# Patient Record
Sex: MALE | Race: ASIAN | NOT HISPANIC OR LATINO | URBAN - METROPOLITAN AREA
[De-identification: names, ages, dates, MRNs, and addresses within clinical notes are randomized per-mention and may not be internally consistent; named-entity substitution may affect disease eponyms.]

---

## 2017-08-29 ENCOUNTER — INPATIENT (INPATIENT)
Facility: HOSPITAL | Age: 22
LOS: 14 days | Discharge: HOME | End: 2017-09-13
Attending: PSYCHIATRY & NEUROLOGY | Admitting: STUDENT IN AN ORGANIZED HEALTH CARE EDUCATION/TRAINING PROGRAM
Payer: COMMERCIAL

## 2017-08-29 VITALS
DIASTOLIC BLOOD PRESSURE: 66 MMHG | RESPIRATION RATE: 18 BRPM | HEART RATE: 87 BPM | SYSTOLIC BLOOD PRESSURE: 120 MMHG | TEMPERATURE: 98 F | OXYGEN SATURATION: 100 %

## 2017-08-29 NOTE — ED ADULT NURSE NOTE - OBJECTIVE STATEMENT
pt received in  c/o noncompliance to meds, pt denies SI,HI&AH, denies ETOH and substance use. awaiting psych eval

## 2017-08-29 NOTE — ED ADULT TRIAGE NOTE - CHIEF COMPLAINT QUOTE
pt arrives bib ems, per ems patients family called 911 because he has been staying in his office for 5 days, not sleeping and not taking any meds. denies any si/hi/ah/vh, no medical complaints. calm and cooperative. thoughts disorganized in triage. denies etoh/ drug use.  called, pt escorted to .

## 2017-08-30 DIAGNOSIS — R46.89 OTHER SYMPTOMS AND SIGNS INVOLVING APPEARANCE AND BEHAVIOR: ICD-10-CM

## 2017-08-30 DIAGNOSIS — F29 UNSPECIFIED PSYCHOSIS NOT DUE TO A SUBSTANCE OR KNOWN PHYSIOLOGICAL CONDITION: ICD-10-CM

## 2017-08-30 LAB
ALBUMIN SERPL ELPH-MCNC: 4.7 G/DL — SIGNIFICANT CHANGE UP (ref 3.3–5)
ALP SERPL-CCNC: 90 U/L — SIGNIFICANT CHANGE UP (ref 40–120)
ALT FLD-CCNC: 11 U/L — SIGNIFICANT CHANGE UP (ref 4–41)
AMORPH CRY # UR COMP ASSIST: SIGNIFICANT CHANGE UP (ref 0–0)
AMPHET UR-MCNC: NEGATIVE — SIGNIFICANT CHANGE UP
APAP SERPL-MCNC: < 15 UG/ML — LOW (ref 15–25)
APPEARANCE UR: SIGNIFICANT CHANGE UP
AST SERPL-CCNC: 17 U/L — SIGNIFICANT CHANGE UP (ref 4–40)
BACTERIA # UR AUTO: SIGNIFICANT CHANGE UP
BARBITURATES MEASUREMENT: NEGATIVE — SIGNIFICANT CHANGE UP
BARBITURATES UR SCN-MCNC: NEGATIVE — SIGNIFICANT CHANGE UP
BASOPHILS # BLD AUTO: 0.05 K/UL — SIGNIFICANT CHANGE UP (ref 0–0.2)
BASOPHILS NFR BLD AUTO: 0.5 % — SIGNIFICANT CHANGE UP (ref 0–2)
BENZODIAZ SERPL-MCNC: NEGATIVE — SIGNIFICANT CHANGE UP
BENZODIAZ UR-MCNC: NEGATIVE — SIGNIFICANT CHANGE UP
BILIRUB SERPL-MCNC: 0.3 MG/DL — SIGNIFICANT CHANGE UP (ref 0.2–1.2)
BILIRUB UR-MCNC: NEGATIVE — SIGNIFICANT CHANGE UP
BLOOD UR QL VISUAL: NEGATIVE — SIGNIFICANT CHANGE UP
BUN SERPL-MCNC: 13 MG/DL — SIGNIFICANT CHANGE UP (ref 7–23)
CALCIUM SERPL-MCNC: 9.6 MG/DL — SIGNIFICANT CHANGE UP (ref 8.4–10.5)
CANNABINOIDS UR-MCNC: NEGATIVE — SIGNIFICANT CHANGE UP
CHLORIDE SERPL-SCNC: 100 MMOL/L — SIGNIFICANT CHANGE UP (ref 98–107)
CO2 SERPL-SCNC: 25 MMOL/L — SIGNIFICANT CHANGE UP (ref 22–31)
COCAINE METAB.OTHER UR-MCNC: NEGATIVE — SIGNIFICANT CHANGE UP
COLOR SPEC: YELLOW — SIGNIFICANT CHANGE UP
CREAT SERPL-MCNC: 0.84 MG/DL — SIGNIFICANT CHANGE UP (ref 0.5–1.3)
EOSINOPHIL # BLD AUTO: 0.16 K/UL — SIGNIFICANT CHANGE UP (ref 0–0.5)
EOSINOPHIL NFR BLD AUTO: 1.7 % — SIGNIFICANT CHANGE UP (ref 0–6)
ETHANOL BLD-MCNC: < 10 MG/DL — SIGNIFICANT CHANGE UP
GLUCOSE SERPL-MCNC: 83 MG/DL — SIGNIFICANT CHANGE UP (ref 70–99)
GLUCOSE UR-MCNC: NEGATIVE — SIGNIFICANT CHANGE UP
HCT VFR BLD CALC: 48.6 % — SIGNIFICANT CHANGE UP (ref 39–50)
HGB BLD-MCNC: 16.1 G/DL — SIGNIFICANT CHANGE UP (ref 13–17)
IMM GRANULOCYTES # BLD AUTO: 0.01 # — SIGNIFICANT CHANGE UP
IMM GRANULOCYTES NFR BLD AUTO: 0.1 % — SIGNIFICANT CHANGE UP (ref 0–1.5)
KETONES UR-MCNC: NEGATIVE — SIGNIFICANT CHANGE UP
LEUKOCYTE ESTERASE UR-ACNC: NEGATIVE — SIGNIFICANT CHANGE UP
LYMPHOCYTES # BLD AUTO: 3.26 K/UL — SIGNIFICANT CHANGE UP (ref 1–3.3)
LYMPHOCYTES # BLD AUTO: 35 % — SIGNIFICANT CHANGE UP (ref 13–44)
MCHC RBC-ENTMCNC: 29 PG — SIGNIFICANT CHANGE UP (ref 27–34)
MCHC RBC-ENTMCNC: 33.1 % — SIGNIFICANT CHANGE UP (ref 32–36)
MCV RBC AUTO: 87.4 FL — SIGNIFICANT CHANGE UP (ref 80–100)
METHADONE UR-MCNC: NEGATIVE — SIGNIFICANT CHANGE UP
MONOCYTES # BLD AUTO: 0.69 K/UL — SIGNIFICANT CHANGE UP (ref 0–0.9)
MONOCYTES NFR BLD AUTO: 7.4 % — SIGNIFICANT CHANGE UP (ref 2–14)
MUCOUS THREADS # UR AUTO: SIGNIFICANT CHANGE UP
NEUTROPHILS # BLD AUTO: 5.14 K/UL — SIGNIFICANT CHANGE UP (ref 1.8–7.4)
NEUTROPHILS NFR BLD AUTO: 55.3 % — SIGNIFICANT CHANGE UP (ref 43–77)
NITRITE UR-MCNC: NEGATIVE — SIGNIFICANT CHANGE UP
NRBC # FLD: 0 — SIGNIFICANT CHANGE UP
OPIATES UR-MCNC: NEGATIVE — SIGNIFICANT CHANGE UP
OXYCODONE UR-MCNC: NEGATIVE — SIGNIFICANT CHANGE UP
PCP UR-MCNC: NEGATIVE — SIGNIFICANT CHANGE UP
PH UR: 6.5 — SIGNIFICANT CHANGE UP (ref 4.6–8)
PLATELET # BLD AUTO: 248 K/UL — SIGNIFICANT CHANGE UP (ref 150–400)
PMV BLD: 10.3 FL — SIGNIFICANT CHANGE UP (ref 7–13)
POTASSIUM SERPL-MCNC: 3.8 MMOL/L — SIGNIFICANT CHANGE UP (ref 3.5–5.3)
POTASSIUM SERPL-SCNC: 3.8 MMOL/L — SIGNIFICANT CHANGE UP (ref 3.5–5.3)
PROT SERPL-MCNC: 7.8 G/DL — SIGNIFICANT CHANGE UP (ref 6–8.3)
PROT UR-MCNC: 10 — SIGNIFICANT CHANGE UP
RBC # BLD: 5.56 M/UL — SIGNIFICANT CHANGE UP (ref 4.2–5.8)
RBC # FLD: 13.1 % — SIGNIFICANT CHANGE UP (ref 10.3–14.5)
RBC CASTS # UR COMP ASSIST: SIGNIFICANT CHANGE UP (ref 0–?)
SALICYLATES SERPL-MCNC: < 5 MG/DL — LOW (ref 15–30)
SODIUM SERPL-SCNC: 140 MMOL/L — SIGNIFICANT CHANGE UP (ref 135–145)
SP GR SPEC: 1.02 — SIGNIFICANT CHANGE UP (ref 1–1.03)
TSH SERPL-MCNC: 1.48 UIU/ML — SIGNIFICANT CHANGE UP (ref 0.27–4.2)
UROBILINOGEN FLD QL: NORMAL E.U. — SIGNIFICANT CHANGE UP (ref 0.1–0.2)
WBC # BLD: 9.31 K/UL — SIGNIFICANT CHANGE UP (ref 3.8–10.5)
WBC # FLD AUTO: 9.31 K/UL — SIGNIFICANT CHANGE UP (ref 3.8–10.5)
WBC UR QL: SIGNIFICANT CHANGE UP (ref 0–?)

## 2017-08-30 PROCEDURE — 99222 1ST HOSP IP/OBS MODERATE 55: CPT

## 2017-08-30 PROCEDURE — 99285 EMERGENCY DEPT VISIT HI MDM: CPT

## 2017-08-30 RX ORDER — RISPERIDONE 4 MG/1
1 TABLET ORAL AT BEDTIME
Qty: 0 | Refills: 0 | Status: DISCONTINUED | OUTPATIENT
Start: 2017-08-30 | End: 2017-09-02

## 2017-08-30 RX ADMIN — Medication 2 MILLIGRAM(S): at 16:01

## 2017-08-30 RX ADMIN — RISPERIDONE 1 MILLIGRAM(S): 4 TABLET ORAL at 22:15

## 2017-08-30 NOTE — ED BEHAVIORAL HEALTH ASSESSMENT NOTE - HPI (INCLUDE ILLNESS QUALITY, SEVERITY, DURATION, TIMING, CONTEXT, MODIFYING FACTORS, ASSOCIATED SIGNS AND SYMPTOMS)
Patient is a 22 year old male, domiciled, unemployed but previously student at [employer] last attended Spring 2017, non-caregiver, with family reported PPH of Anxiety, and OCD, no prior hospitalizations, no current outpatient treatment, denies hx of self harm behaviors, denies prior suicide attempts, denies hx of violence or arrests, denies trauma, denies substance use/abuse, with no relevant past medical history, brought in by EMS/Family, presenting with psychosis.    Spoke with pt's parents and brother - Patient has a PPH of Anxiety and OCD (as per family, previously diagnosed by psychiatrist). He was previously in treatment with a psychiatrist Dr. Altaf Georges and prescribed Lexapro. In addition, he was followed by school psychologist Dr. Sterling Frazier. Patient was previously in [employer] and doing well. However, about one year ago, he was not doing well so transferred to [employer]. Patient has been attempting to take excessive number of classes despite transferring to lower his work load. Patient will then drop the classes after a few weeks, causing his family to lose tuition money. Patient has stopped taking his Lexapro, although unclear how long this has been going on. Patient has been taking Piracetam and Ashwagandha, supplements from the internet instead because he wants to improve his brain function. Since the end of last semester, pt's family has started to notice an acute change in behavior, although they state it may have been going on for longer without them realizing. For the past 2-3 months, Patient is a 22 year old male, domiciled, unemployed but previously student at [employer] last attended Spring 2017, non-caregiver, with family reported PPH of Anxiety, and OCD, no prior hospitalizations, no current outpatient treatment, denies hx of self harm behaviors, denies prior suicide attempts, denies hx of violence or arrests, denies trauma, denies substance use/abuse, with no relevant past medical history, brought in by EMS/Family, presenting with psychosis. Patient is a 22 year old male, domiciled, unemployed but previously student at [employer] last attended Spring 2017, non-caregiver, with family reported PPH of Anxiety, and OCD, no prior hospitalizations, no current outpatient treatment, denies hx of self harm behaviors, denies prior suicide attempts, denies hx of violence or arrests, denies trauma, denies substance use/abuse, with no relevant past medical history, brought in by EMS/Family, presenting with psychosis.    See  note for extensive collateral from parents, brother, and Dr. Cervantes (Ophthalmologist who called 911 tonight). In summary - for the past 2-3 months pt has been decompensating, isolative, responding to internal stimuli, listening to white noise constantly, delusional, paranoid, not sleeping, not eating, not showering. Family advocates for admission. Patient is a 22 year old male, domiciled, unemployed but previously student at [employer] last attended Spring 2017, non-caregiver, with family reported PPH of Anxiety, and OCD, no prior hospitalizations, no current outpatient treatment, denies hx of self harm behaviors, denies prior suicide attempts, denies hx of violence or arrests, denies trauma, denies substance use/abuse, with no relevant past medical history, brought in by EMS/Family, presenting with psychosis.    See  note for extensive collateral from parents, brother, and Dr. Cervantes (Ophthalmologist who called 911 tonight). In summary - for the past 2-3 months pt has been decompensating, isolative, responding to internal stimuli, listening to white noise constantly, delusional, paranoid, not sleeping, not eating, not showering. Family advocates for admission.    Pt noted to be disorganized on exam, having trouble answering questions, notably internally preoccupied and mumbling to himself. Patient reports he is only here because his parents are concerned. He states he has been staying in his place of work and working on a project (as per dad pt has been going to dad's office). Pt refusing to state what this project is, but states it has to do with pharmaceuticals and animals. Pt also states he is working on a gun control assignment for college even though he is not registered for classes. Pt states he has been "napping" for 2-4 hours at the office in between working but has not slept a full night's sleep in a while (unable to state). Pt states he feels "very good" and has "a lot of good and positive energy". Pt admits to not showering for about 2 weeks, states "that's normal though. I'm sure other people don't need to shower. Besides I have naturally good odor". He states his mind is full of "numbers, associates and files" which is why he is struggling to answer questions and noted to be mumbling to himself at times. Pt denies any s/s of depression, denies suicidal ideations, intent or plans. Denies aggressive or homicidal ideations, intent or plans. Denies alcohol or drug use/abuse but later told other staff "I partied a lot, maybe I partied too much".

## 2017-08-30 NOTE — ED ADULT NURSE REASSESSMENT NOTE - GENERAL PATIENT STATE
pt remains awake, restless, laughing to self with disorganized thought process. Pt minimizes psychiatric symptoms, denies s/i h/i or a/v/h, states 'I was in my office, I am 22 years of age and my family is concerned"./no change observed

## 2017-08-30 NOTE — ED PROVIDER NOTE - OBJECTIVE STATEMENT
22M denying any PMH, per triage note "pt arrives bib ems, per ems patients family called 911 because he has been staying in his office for 5 days, not sleeping and not taking any meds. denies any si/hi/ah/vh, no medical complaints. calm and cooperative. thoughts disorganized in triage. denies etoh/ drug use.  called, pt escorted to "  PEr pt, he was just working in his office 22M denying any PMH, per triage note "pt arrives bib ems, per ems patients family called 911 because he has been staying in his office for 5 days, not sleeping and not taking any meds. denies any si/hi/ah/vh, no medical complaints. calm and cooperative. thoughts disorganized in triage. denies etoh/ drug use.  called, pt escorted to "  Per pt, he was just working in his office. Denies HA, vision changes, weakness/numbness, f/c, SOB/CP, abd pain, urinary complaints, SI/HI. Denies psychiatric hx.

## 2017-08-30 NOTE — ED BEHAVIORAL HEALTH ASSESSMENT NOTE - RISK ASSESSMENT
Risk factors: Single, male, hx of depression/anxiety, currently psychotic and disorganized, academic decline, non-compliant with outpatient follow-up, poor insight into symptoms, difficulty expressing thoughts, not sleeping, not eating properly, not caring for ADLs.

## 2017-08-30 NOTE — ED BEHAVIORAL HEALTH ASSESSMENT NOTE - CASE SUMMARY
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 Pt has no insight and unwilling to engage/re-enter outpt care, requires emergency admission for safety and stabilization.

## 2017-08-30 NOTE — ED BEHAVIORAL HEALTH ASSESSMENT NOTE - OTHER
Brother Family/Dr. Cervantes Ophthalmologist Previously enrolled at [employer] with last attending Spring 2017 Psychosis Lower end of average build Denies auditory/visual hallucinations however responding to internal stimuli in ER Psychosis, disorganized [id number]

## 2017-08-30 NOTE — ED BEHAVIORAL HEALTH ASSESSMENT NOTE - SUMMARY
Patient is a 22 year old male, domiciled, unemployed but previously student at [employer] last attended Spring 2017, non-caregiver, with family reported PPH of Anxiety, and OCD, no prior hospitalizations, no current outpatient treatment, denies hx of self harm behaviors, denies prior suicide attempts, denies hx of violence or arrests, denies trauma, denies substance use/abuse, with no relevant past medical history, brought in by EMS/Family, presenting with psychosis.    Pt disorganized, internally preoccupied, disheveled and as per parents has been paranoid, delusional, not showering, not sleeping, and only eating select foods at home. Pt is no longer able to care for himself and requires hospitalization for safety and stabilization.

## 2017-08-30 NOTE — ED PROVIDER NOTE - MEDICAL DECISION MAKING DETAILS
22M denies any PMH p/w concern for not sleeping, acting strange. Pt denying any complaints. Vitals wnl, exam as above.  ddx: Likely new psych. Will eval for underlying metabolic component.  CBC, cmp, TSH, EKG. BH eval reassess.

## 2017-08-30 NOTE — ED PROVIDER NOTE - PHYSICAL EXAMINATION
No clonus, rigidity, tremors, fasciculations. PERRL, EOMI, no nystagmus. Strength 5/5.  steady unassisted gait.  disorganized, occasionally stuttering/mumbling/rambling

## 2017-08-30 NOTE — ED BEHAVIORAL HEALTH ASSESSMENT NOTE - SUICIDE PROTECTIVE FACTORS
Positive therapeutic relationships/Supportive social network or family/Identifies reasons for living/Future oriented
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
## 2017-08-30 NOTE — ED PROVIDER NOTE - PROGRESS NOTE DETAILS
[facility]: labs, ua/tox wnl. EKG sinus medardo. Medically cleared for further BH eval. [place]: Medically cleared for [facility] admission.

## 2017-08-31 PROCEDURE — 99232 SBSQ HOSP IP/OBS MODERATE 35: CPT

## 2017-08-31 RX ADMIN — RISPERIDONE 1 MILLIGRAM(S): 4 TABLET ORAL at 21:32

## 2017-09-01 PROCEDURE — 99232 SBSQ HOSP IP/OBS MODERATE 35: CPT

## 2017-09-01 RX ADMIN — RISPERIDONE 1 MILLIGRAM(S): 4 TABLET ORAL at 22:04

## 2017-09-02 PROCEDURE — 99232 SBSQ HOSP IP/OBS MODERATE 35: CPT

## 2017-09-02 RX ORDER — RISPERIDONE 4 MG/1
2 TABLET ORAL AT BEDTIME
Qty: 0 | Refills: 0 | Status: DISCONTINUED | OUTPATIENT
Start: 2017-09-02 | End: 2017-09-13

## 2017-09-02 RX ADMIN — RISPERIDONE 2 MILLIGRAM(S): 4 TABLET ORAL at 21:26

## 2017-09-03 PROCEDURE — 99232 SBSQ HOSP IP/OBS MODERATE 35: CPT

## 2017-09-03 RX ADMIN — RISPERIDONE 2 MILLIGRAM(S): 4 TABLET ORAL at 21:04

## 2017-09-04 PROCEDURE — 99232 SBSQ HOSP IP/OBS MODERATE 35: CPT

## 2017-09-04 RX ADMIN — RISPERIDONE 2 MILLIGRAM(S): 4 TABLET ORAL at 21:29

## 2017-09-05 PROCEDURE — 99232 SBSQ HOSP IP/OBS MODERATE 35: CPT | Mod: GC

## 2017-09-05 RX ORDER — SENNA PLUS 8.6 MG/1
2 TABLET ORAL AT BEDTIME
Qty: 0 | Refills: 0 | Status: DISCONTINUED | OUTPATIENT
Start: 2017-09-05 | End: 2017-09-11

## 2017-09-05 RX ADMIN — SENNA PLUS 2 TABLET(S): 8.6 TABLET ORAL at 23:16

## 2017-09-05 RX ADMIN — RISPERIDONE 2 MILLIGRAM(S): 4 TABLET ORAL at 21:16

## 2017-09-06 PROCEDURE — 99232 SBSQ HOSP IP/OBS MODERATE 35: CPT | Mod: GC

## 2017-09-06 RX ADMIN — RISPERIDONE 2 MILLIGRAM(S): 4 TABLET ORAL at 21:25

## 2017-09-07 PROCEDURE — 99232 SBSQ HOSP IP/OBS MODERATE 35: CPT | Mod: GC

## 2017-09-07 RX ADMIN — RISPERIDONE 2 MILLIGRAM(S): 4 TABLET ORAL at 21:03

## 2017-09-08 PROCEDURE — 99232 SBSQ HOSP IP/OBS MODERATE 35: CPT | Mod: GC

## 2017-09-08 RX ADMIN — RISPERIDONE 2 MILLIGRAM(S): 4 TABLET ORAL at 21:45

## 2017-09-09 RX ADMIN — RISPERIDONE 2 MILLIGRAM(S): 4 TABLET ORAL at 21:16

## 2017-09-10 RX ORDER — LANOLIN ALCOHOL/MO/W.PET/CERES
3 CREAM (GRAM) TOPICAL AT BEDTIME
Qty: 0 | Refills: 0 | Status: DISCONTINUED | OUTPATIENT
Start: 2017-09-10 | End: 2017-09-13

## 2017-09-10 RX ADMIN — Medication 3 MILLIGRAM(S): at 21:00

## 2017-09-10 RX ADMIN — RISPERIDONE 2 MILLIGRAM(S): 4 TABLET ORAL at 21:03

## 2017-09-11 PROCEDURE — 99232 SBSQ HOSP IP/OBS MODERATE 35: CPT

## 2017-09-11 RX ORDER — SENNA PLUS 8.6 MG/1
2 TABLET ORAL AT BEDTIME
Qty: 0 | Refills: 0 | Status: DISCONTINUED | OUTPATIENT
Start: 2017-09-11 | End: 2017-09-13

## 2017-09-11 RX ORDER — RISPERIDONE 4 MG/1
1 TABLET ORAL
Qty: 15 | Refills: 0 | OUTPATIENT
Start: 2017-09-11 | End: 2017-09-26

## 2017-09-11 RX ADMIN — Medication 3 MILLIGRAM(S): at 20:44

## 2017-09-11 RX ADMIN — RISPERIDONE 2 MILLIGRAM(S): 4 TABLET ORAL at 20:44

## 2017-09-12 PROCEDURE — 90853 GROUP PSYCHOTHERAPY: CPT

## 2017-09-12 PROCEDURE — 99232 SBSQ HOSP IP/OBS MODERATE 35: CPT | Mod: GC

## 2017-09-12 RX ADMIN — RISPERIDONE 2 MILLIGRAM(S): 4 TABLET ORAL at 21:45

## 2017-09-13 VITALS — HEART RATE: 89 BPM | TEMPERATURE: 97 F | DIASTOLIC BLOOD PRESSURE: 69 MMHG | SYSTOLIC BLOOD PRESSURE: 96 MMHG

## 2017-09-13 PROCEDURE — 99238 HOSP IP/OBS DSCHRG MGMT 30/<: CPT | Mod: GC

## 2017-09-19 ENCOUNTER — OUTPATIENT (OUTPATIENT)
Dept: OUTPATIENT SERVICES | Facility: HOSPITAL | Age: 22
LOS: 1 days | Discharge: HOME | End: 2017-09-19

## 2017-09-26 DIAGNOSIS — F20.81 SCHIZOPHRENIFORM DISORDER: ICD-10-CM

## 2017-10-24 PROBLEM — Z00.00 ENCOUNTER FOR PREVENTIVE HEALTH EXAMINATION: Status: ACTIVE | Noted: 2017-10-24

## 2017-10-25 ENCOUNTER — APPOINTMENT (OUTPATIENT)
Dept: MRI IMAGING | Facility: HOSPITAL | Age: 22
End: 2017-10-25

## 2017-10-30 ENCOUNTER — APPOINTMENT (OUTPATIENT)
Dept: MRI IMAGING | Facility: CLINIC | Age: 22
End: 2017-10-30

## 2017-11-13 NOTE — ED PROVIDER NOTE - CADM POA PRESS ULCER
[facility] Labor and Delivery History and Physical    Paulo Lema MRN# [id number]   Age: 27 year old YOB: 1990     Date of Admission:  2017    Primary care provider: No Ref-Primary, Physician           Chief Complaint:   Paulo Lema is a 27 year old female who is 39w1d pregnant and recommended for .          Pregnancy history:     OBSTETRIC HISTORY:    Obstetric History       T0      L0     SAB0   TAB0   Ectopic0   Multiple0   Live Births0       # Outcome Date GA Lbr González/2nd Weight Sex Delivery Anes PTL Lv   1 Current                   EDC: Estimated Date of Delivery: Data Unavailable    Prenatal Labs: Lab Results   Component Value Date    ABO A 2017    RH Pos 2017    AS Neg 2017    HEPBANG Nonreactive 2017    CHPCRT  2017     Negative   Negative for C. trachomatis rRNA by transcription mediated amplification.   A negative result by transcription mediated amplification does not preclude the   presence of C. trachomatis infection because results are dependent on proper   and adequate collection, absence of inhibitors, and sufficient rRNA to be   detected.      GCPCRT  2017     Negative   Negative for N. gonorrhoeae rRNA by transcription mediated amplification.   A negative result by transcription mediated amplification does not preclude the   presence of N. gonorrhoeae infection because results are dependent on proper   and adequate collection, absence of inhibitors, and sufficient rRNA to be   detected.      TREPAB Negative 2017    HGB 10.0 (L) 08/10/2017       GBS Status:   Lab Results   Component Value Date    GBS Negative 10/24/2017       Active Problem List  Patient Active Problem List   Diagnosis     High risk pregnancy in young primigravida     Indication for care in labor or delivery       Medication Prior to Admission  Prescriptions Prior to Admission   Medication Sig Dispense Refill Last Dose      Prenatal Vit-Fe Fumarate-FA (PRENATAL MULTIVITAMIN  PLUS IRON) 27-0.8 MG TABS per tablet Take 1 tablet by mouth daily   11/10/2017 at Unknown time     Misc. Devices (BREAST PUMP) MISC 1 each daily 1 each 0      insulin isophane human (HUMULIN N PEN) 100 UNIT/ML injection Inject up to 12 units before bed every day. 30 mL 1 Taking     doxylamine (UNISOM) 25 MG TABS tablet Take 25 mg by mouth At Bedtime   Taking     insulin pen needle (INSUPEN PEN NEEDLES) 32G X 4 MM Use 1 pen needle daily or as directed. 100 each 1 Taking     blood glucose monitoring (DACIA MICROLET) lancets Use to test blood sugar 4 times daily or as directed. 100 each 1 Taking     acetone, Urine, test STRP Test once daily x1 week, then reduce to once weekly if consistently negative 100 each 3 Taking     DACIA CONTOUR NEXT test strip USE TO TEST BLOOD SUGAR FOUR TIMES DAILY OR AS DIRECTED 300 strip 3 Taking   .        Maternal Past Medical History:     Past Medical History:   Diagnosis Date     Diabetes (H)     w/ first pregnancy-on insulin                       Family History:     Family History   Problem Relation Age of Onset     Family History Negative Mother                Social History:     Social History   Substance Use Topics     Smoking status: Never Smoker     Smokeless tobacco: Never Used     Alcohol use No            Review of Systems:   The Review of Systems is negative other than noted in the HPI          Physical Exam:   Patient Vitals for the past 8 hrs:   BP Temp Temp src Resp SpO2   11/12/17 1853 - 99.1  F (37.3  C) Axillary - -   11/12/17 1745 119/65 - - - -   11/12/17 1715 120/70 - - - -   11/12/17 1710 - 98.5  F (36.9  C) Axillary - -   11/12/17 1606 - - - - 96 %   11/12/17 1551 - - - - 97 %   11/12/17 1536 - - - - 94 %   11/12/17 1521 - - - - 95 %   11/12/17 1510 119/68 98.2  F (36.8  C) Axillary 18 -   11/12/17 1500 117/61 98.8  F (37.1  C) Oral 16 -   11/12/17 1421 - - - - 99 %   11/12/17 1417 122/67 - - - -   11/12/17 1415  122/64 - - 16 -   17 1413 128/76 - - 16 -   17 1411 133/79 - - 16 -   17 1405 134/72 - - - 99 %   17 1335 - - - - 98 %   17 1305 128/75 - - - -   17 1251 - 98.6  F (37  C) Oral - 99 %   17 1206 - - - - 97 %   17 1204 116/63 - - 16 -   17 1133 114/63 - - 16 -     Constitutional:   awake, alert, cooperative, no apparent distress, and appears stated age     Lungs:   No increased work of breathing, good air exchange, clear to auscultation bilaterally, no crackles or wheezing     Cardiovascular:   Normal apical impulse, regular rate and rhythm, normal S1 and S2, no S3 or S4, and no murmur noted     Abdomen:   No scars, normal bowel sounds, soft, gravid, non-tender     Neuropsychiatric:   General: normal, calm and normal eye contact     Extremities: no edema bilateral      Cervix:5.6/90/-1  Presentation:Cephalic  Fetal Heart Rate Tracing: reactive and reassuring at 160 bpm  Tocometer: IUPC and frequency q 2-4 minutes                      Assessment:   Paulo Lema is a 39w1d pregnant female admitted for induction of labor for A2 GDM; patient without further progress in labor past 5.5 cm (different examiners for cervical exam noting 5 cm and 5.5 cm) for greater than 4 hours, despite adequate uterine contractions.    Recommended to proceed to  section for arrest of dilation; procedure, risks discussed.   All questions answered.              Plan:   Prepare for  sectionCatkatarzyna Villarreal MD       No

## 2018-03-12 ENCOUNTER — OUTPATIENT (OUTPATIENT)
Dept: OUTPATIENT SERVICES | Facility: HOSPITAL | Age: 23
LOS: 1 days | Discharge: HOME | End: 2018-03-12

## 2018-04-11 ENCOUNTER — EMERGENCY (EMERGENCY)
Facility: HOSPITAL | Age: 23
LOS: 1 days | Discharge: HOME | End: 2018-04-11
Admitting: EMERGENCY MEDICINE
Payer: COMMERCIAL

## 2018-04-11 VITALS
OXYGEN SATURATION: 100 % | DIASTOLIC BLOOD PRESSURE: 68 MMHG | SYSTOLIC BLOOD PRESSURE: 93 MMHG | TEMPERATURE: 98 F | RESPIRATION RATE: 18 BRPM | HEART RATE: 71 BPM

## 2018-04-11 DIAGNOSIS — R69 ILLNESS, UNSPECIFIED: ICD-10-CM

## 2018-04-11 DIAGNOSIS — F20.9 SCHIZOPHRENIA, UNSPECIFIED: ICD-10-CM

## 2018-04-11 PROCEDURE — 93010 ELECTROCARDIOGRAM REPORT: CPT | Mod: 59

## 2018-04-11 PROCEDURE — 99285 EMERGENCY DEPT VISIT HI MDM: CPT | Mod: 25

## 2018-04-11 PROCEDURE — 90792 PSYCH DIAG EVAL W/MED SRVCS: CPT

## 2018-04-11 NOTE — ED ADULT TRIAGE NOTE - CHIEF COMPLAINT QUOTE
Pt has had respiridol decreased from 2 to 1.70- 2weeks ago since then pt is having suicidal thoughts

## 2018-04-11 NOTE — ED PROVIDER NOTE - OBJECTIVE STATEMENT
This is a 22 year old Male PMHX schizophrenia, OCD came in today for psych eval r/t suicidal ideations. Patient reports recent changes in medications  Risperdal decrease and is having intrusive thoughts. Denies HI Denies AH/VH Denies ETOH/Illicit drugs Reports increasing depression and states " I need to get away from here"

## 2018-04-11 NOTE — ED ADULT NURSE REASSESSMENT NOTE - NS ED NURSE REASSESS COMMENT FT1
Patient medically cleared, pt in improved and stable condition, discharged as per MD Montaño order, discharge instructions given, pt verbalized understanding and left ER a&ox3 with family.

## 2018-04-11 NOTE — ED PROVIDER NOTE - MEDICAL DECISION MAKING DETAILS
This is a 22 year old Male PMHX schizophrenia, OCD came in today for psych eval r/t suicidal ideations.  Medical evaluation performed. There is no clinical evidence of intoxication or any acute medical problem requiring immediate intervention. Final disposition will be determined by psychiatrist.

## 2018-04-11 NOTE — ED BEHAVIORAL HEALTH ASSESSMENT NOTE - DESCRIPTION
Denies See HPI Vital Signs Last 24 Hrs  T(C): 36.6 (11 Apr 2018 13:01), Max: 36.6 (11 Apr 2018 13:01)  T(F): 97.8 (11 Apr 2018 13:01), Max: 97.8 (11 Apr 2018 13:01)  HR: 71 (11 Apr 2018 13:01) (71 - 71)  BP: 93/68 (11 Apr 2018 13:01) (93/68 - 93/68)  BP(mean): --  RR: 18 (11 Apr 2018 13:01) (18 - 18)  SpO2: 100% (11 Apr 2018 13:01) (100% - 100%)    Calm and cooperative. lives with parents, unemployed

## 2018-04-11 NOTE — ED BEHAVIORAL HEALTH ASSESSMENT NOTE - RISK ASSESSMENT
Patient has baseline risk of harm given chronic risk factors for self harm, including schizophrenia, one prior inpatient psychiatric admissions, young age. Protective factors include no prior SA, patient currently denies S/H I/I/P, with supportive family, no substance use, stable housing, compliant with treatment, good therapeutic alliance. Pt's baseline risk for suicide appears to be low at this time and he does not present in imminent risk of harm; inpatient psychiatric admission is not indicated at this time. Patient has baseline risk of harm given chronic risk factors for self harm, including schizophrenia, one prior inpatient psychiatric admissions, young age. Protective factors include no prior SA, patient currently denies S/H I/I/P, with supportive family, no substance use, stable housing, compliant with treatment, good therapeutic alliance. He does not present in imminent risk of harm; inpatient psychiatric admission is not indicated at this time.

## 2018-04-11 NOTE — ED BEHAVIORAL HEALTH ASSESSMENT NOTE - SAFETY PLAN DETAILS
Call 911 or return to the ED should you develop thoughts to hurt yourself or others and or if you feel unsafe.

## 2018-04-11 NOTE — ED BEHAVIORAL HEALTH ASSESSMENT NOTE - CASE SUMMARY
Patient is a 22 year old male, domiciled with family, history of Schizophrenia, one prior hospitalization ([facility] 8/30/17 to 9/13/17)  no known suicide and self harm behaviors, no known history of aggression/violence, denies substance use/abuse, brought in by self accompanied by his mother for intrusive thoughts. As per family and psychiatrist, this is his chronic delusion of getting cured with TMS, and yoga, patient was insistent so they brought him to the ER.  They have no safety concerns, were not brought here for SI/Hi, any intent plan. patient during the interview is calm pleasant explained that the ER process and that he should return to psychiatrist to get further treatment patient verbalized understanding requested discharge so he can go eat as "I am hungry", he denies any si/hi, any intent plan, or any other destructive thoughts. Patient will be going to psychiatrist appointment after ED discharge. Psychiatrist has no safety concerns, denies patient having any history of SI/HI or SA. . patient is not an imminent risk to self or others at this time.

## 2018-04-11 NOTE — ED BEHAVIORAL HEALTH ASSESSMENT NOTE - SUMMARY
Patient is a 22 year old male, domiciled with family, , non-caregiver, with family reported PPH of Schizophrenia, one prior hospitalization ([facility] 1[facility] 8/30/17 to 9/13/17), denies hx of suicide and self harm behaviors, denies hx of violence or arrests, denies substance use/abuse, with no relevant past medical history, brought in by self accompanied by his mother for intrusive thoughts. On evaluation, despite the patient being referred for SI, the patient denied having any suicidal ideation, intent or plan. The patient's parents also denied having any safety concerns and denied that the patient was making suicidal statements. The patient reported having intrusive thoughts about trying to find "innovative technologies" for his disorder. Due to lack of any safety concerns and stable psychotic symptoms, the patient does not meet criteria for an inpatient hospitalization at this time. The patient's outpatient psychiatrist agreed to follow-up the patient today after ED discharge. Patient is a 22 year old male, domiciled with family, , non-caregiver, with family reported PPH of Schizophrenia, one prior hospitalization ([facility] 1S 8/30/17 to 9/13/17), denies hx of suicide and self harm behaviors, denies hx of violence or arrests, denies substance use/abuse, with no relevant past medical history, brought in by self accompanied by his mother for intrusive thoughts. On evaluation, despite the patient being referred for SI as per triage, the patient denied having any suicidal ideation, intent or plan. . They have no safety concerns and denied that the patient was making suicidal statements. The patient reported having intrusive thoughts about trying to find "innovative technologies" for his disorder. Due to lack of any safety concerns and stable psychotic symptoms, the patient does not meet criteria for an inpatient hospitalization at this time. The patient's outpatient psychiatrist agreed to follow-up the patient today after ED discharge. Safety planning done with family. They verbalized understanding.

## 2018-04-11 NOTE — ED BEHAVIORAL HEALTH ASSESSMENT NOTE - HPI (INCLUDE ILLNESS QUALITY, SEVERITY, DURATION, TIMING, CONTEXT, MODIFYING FACTORS, ASSOCIATED SIGNS AND SYMPTOMS)
Patient is a 22 year old male, domiciled with family, , non-caregiver, with family reported PPH of Schizophrenia, one prior hospitalization ([facility] 1S 8/30/17 to 9/13/17) ,, denies hx of suicide and self harm behaviors, denies hx of violence or arrests, denies substance use/abuse, with no relevant past medical history, brought in by EMS    See  note for extensive collateral from parents, brother, and Dr. Cervantes (Ophthalmologist who called 911 tonight). In summary - for the past 2-3 months pt has been decompensating, isolative, responding to internal stimuli, listening to white noise constantly, delusional, paranoid, not sleeping, not eating, not showering. Family advocates for admission.    Pt noted to be disorganized on exam, having trouble answering questions, notably internally preoccupied and mumbling to himself. Patient reports he is only here because his parents are concerned. He states he has been staying in his place of work and working on a project (as per dad pt has been going to dad's office). Pt refusing to state what this project is, but states it has to do with pharmaceuticals and animals. Pt also states he is working on a gun control assignment for college even though he is not registered for classes. Pt states he has been "napping" for 2-4 hours at the office in between working but has not slept a full night's sleep in a while (unable to state). Pt states he feels "very good" and has "a lot of good and positive energy". Pt admits to not showering for about 2 weeks, states "that's normal though. I'm sure other people don't need to shower. Besides I have naturally good odor". He states his mind is full of "numbers, associates and files" which is why he is struggling to answer questions and noted to be mumbling to himself at times. Pt denies any s/s of depression, denies suicidal ideations, intent or plans. Denies aggressive or homicidal ideations, intent or plans. Denies alcohol or drug use/abuse but later told other staff "I partied a lot, maybe I partied too much".    Collateral: pt reported that he was having intrusive thoughts. Denies making suicidal statements. Risperdal was reduced to 2 mg 2 weeks ago, due to tachycardia. Patient is a 22 year old male, domiciled with family, , non-caregiver, with family reported PPH of Schizophrenia, one prior hospitalization ([facility] 1S 8/30/17 to 9/13/17) ,, denies hx of suicide and self harm behaviors, denies hx of violence or arrests, denies substance use/abuse, with no relevant past medical history, brought in by EMS      Collateral Mr. Bustillos [phone] (pt's father): pt reported that he was having intrusive thoughts. Reports that pt and his mother were on their way to outpatient provider and pt wanted to come to the ED for "more advanced treatment." He reports that pt has a belief that TMS is going to cure him. Denies making suicidal statements. Risperdal was reduced to 2 mg 2 weeks ago, due to tachycardia. Reports that pt has been compliant with medications. He denies having any safety concerns with the patient.     Collateral Dr. Georges: (outpatient psychiatrist): reports that pt has always been obsessing over "advanced technologies" to find a cure for his disorder. He reports that pt was enroute to a follow-up appointment however pt told his mother that he wanted to go to the ED "for more technological advanced treatment." He reports that pt's Risperdal was reduced to 2 mg weeks ago because Patient is a 22 year old male, domiciled with family, , non-caregiver, with family reported PPH of Schizophrenia, one prior hospitalization ([facility] 1S 8/30/17 to 9/13/17) ,, denies hx of suicide and self harm behaviors, denies hx of violence or arrests, denies substance use/abuse, with no relevant past medical history, brought in by self accompanied by his mother for intrusive thoughts.     Pt says that he came in because "I want a new innovative approach." Pt denies being suicidal and denies coming in for suicidality. Pt says that he wanted to get better help for his intrusive thoughts. Pt says that he has been getting thoughts of doing yoga and fitness to help treat him. Pt says that he has been doing okay but says that the intrusive thoughts became frequent 2-3 days ago. He denies feeling depressed. He also denied having any auditory or visual hallucinations. He also denied experiencing any depressed symptoms or manic symptoms. He also denied any substance use.     Collateral Mr. Bustillos [phone] (pt's father): pt reported that he was having intrusive thoughts. Reports that pt and his mother were on their way to outpatient provider and pt wanted to come to the ED for "more advanced treatment." He reports that pt has a belief that TMS is going to cure him. Denies making suicidal statements. Risperdal was reduced to 2 mg 2 weeks ago, due to tachycardia. Reports that pt has been compliant with medications. He denies having any safety concerns with the patient.     Collateral Dr. Georges: (outpatient psychiatrist): reports that pt has always been obsessing over "advanced technologies" to find a cure for his disorder. He reports that pt was enroute to a follow-up appointment however pt told his mother that he wanted to go to the ED "for more technological advanced treatment." He reports that pt's Risperdal was reduced to 2 mg weeks ago because of tachycardia. Reports that he is in the process of titrating the patient's Risperdal to Abilify however he says that pt has shown some resistance to changing medications because pt believes that TMS will cure him. Reports that he spoke to pt 2 days ago, denies that the patient was endorsing any suicidal or homicidal statements. Reports that the patient could come in for a follow-up appointment after being discharged from the ED. Patient is a 22 year old male, domiciled with family, , non-caregiver, with family reported PPH of Schizophrenia, one prior hospitalization ([facility] 1S 8/30/17 to 9/13/17) ,, denies hx of suicide and self harm behaviors, denies hx of violence or arrests, denies substance use/abuse, with no relevant past medical history, brought in by self accompanied by his mother for intrusive thoughts.     Pt says that he came in because "I want a new innovative approach." Pt denies being suicidal and denies coming in for suicidality. Pt says that he wanted to get better help for his intrusive thoughts. Pt says that he has been getting intrusive thoughts of doing yoga and fitness and TMS to help treat him. Pt says that he has been doing okay but says that the intrusive thoughts became frequent 2-3 days ago. He denies feeling depressed. He also denied having any auditory or visual hallucinations. He also denied experiencing any depressed symptoms or manic symptoms. He also denied any substance use.     Collateral Mr. Bustillos [phone] (pt's father): pt reported that he was having intrusive thoughts. Reports that pt and his mother were on their way to outpatient provider and pt wanted to come to the ED for "more advanced treatment." He reports that pt has a belief that TMS is going to cure him. He kept insisting so they brought him to the ER. Denies making suicidal statements. Risperdal was reduced to 2 mg 2 weeks ago, due to tachycardia. Reports that pt has been compliant with medications. Parents deny that patient mentioned anything about suicidality. They deny any self harming behaviors. He denies having any safety concerns with the patient.     Collateral Dr. Georges: (outpatient psychiatrist): reports that pt has always been obsessing over "advanced technologies" to find a cure for his disorder and this is his baseline delusion. He reports that pt was enroute to a follow-up appointment however pt told his mother that he wanted to go to the ED "for more technological advanced treatment." He reports that pt's Risperdal was reduced to 2 mg weeks ago because of tachycardia. Reports that he is in the process of titrating the patient's Risperdal to Abilify however he says that pt has shown some resistance to changing medications because pt believes that TMS will cure him. Reports that he spoke to pt 2 days ago, denies that the patient was endorsing any suicidal or homicidal statements. Reports that the patient could come in for a follow-up appointment after being discharged from the ED. He has no safety concerns

## 2018-04-11 NOTE — ED BEHAVIORAL HEALTH ASSESSMENT NOTE - CURRENT MEDICATION
None - taking supplements from online including Piracetam and Ashwagandha Risperdal 2 mg, Lexapro 15 mg.

## 2018-05-04 ENCOUNTER — INPATIENT (INPATIENT)
Facility: HOSPITAL | Age: 23
LOS: 6 days | Discharge: HOME | End: 2018-05-11
Attending: PSYCHIATRY & NEUROLOGY | Admitting: PSYCHIATRY & NEUROLOGY
Payer: COMMERCIAL

## 2018-05-04 VITALS
OXYGEN SATURATION: 97 % | RESPIRATION RATE: 16 BRPM | HEART RATE: 90 BPM | TEMPERATURE: 99 F | SYSTOLIC BLOOD PRESSURE: 118 MMHG | DIASTOLIC BLOOD PRESSURE: 68 MMHG

## 2018-05-04 DIAGNOSIS — F20.9 SCHIZOPHRENIA, UNSPECIFIED: ICD-10-CM

## 2018-05-04 LAB
ALBUMIN SERPL ELPH-MCNC: 4.6 G/DL — SIGNIFICANT CHANGE UP (ref 3.3–5)
ALP SERPL-CCNC: 95 U/L — SIGNIFICANT CHANGE UP (ref 40–120)
ALT FLD-CCNC: 16 U/L — SIGNIFICANT CHANGE UP (ref 4–41)
AMPHET UR-MCNC: NEGATIVE — SIGNIFICANT CHANGE UP
APAP SERPL-MCNC: < 15 UG/ML — LOW (ref 15–25)
APPEARANCE UR: CLEAR — SIGNIFICANT CHANGE UP
AST SERPL-CCNC: 22 U/L — SIGNIFICANT CHANGE UP (ref 4–40)
BARBITURATES UR SCN-MCNC: NEGATIVE — SIGNIFICANT CHANGE UP
BASOPHILS # BLD AUTO: 0.04 K/UL — SIGNIFICANT CHANGE UP (ref 0–0.2)
BASOPHILS NFR BLD AUTO: 0.4 % — SIGNIFICANT CHANGE UP (ref 0–2)
BENZODIAZ UR-MCNC: NEGATIVE — SIGNIFICANT CHANGE UP
BILIRUB SERPL-MCNC: 0.4 MG/DL — SIGNIFICANT CHANGE UP (ref 0.2–1.2)
BILIRUB UR-MCNC: NEGATIVE — SIGNIFICANT CHANGE UP
BLOOD UR QL VISUAL: NEGATIVE — SIGNIFICANT CHANGE UP
BUN SERPL-MCNC: 13 MG/DL — SIGNIFICANT CHANGE UP (ref 7–23)
CALCIUM SERPL-MCNC: 9.5 MG/DL — SIGNIFICANT CHANGE UP (ref 8.4–10.5)
CANNABINOIDS UR-MCNC: NEGATIVE — SIGNIFICANT CHANGE UP
CHLORIDE SERPL-SCNC: 100 MMOL/L — SIGNIFICANT CHANGE UP (ref 98–107)
CO2 SERPL-SCNC: 26 MMOL/L — SIGNIFICANT CHANGE UP (ref 22–31)
COCAINE METAB.OTHER UR-MCNC: NEGATIVE — SIGNIFICANT CHANGE UP
COLOR SPEC: YELLOW — SIGNIFICANT CHANGE UP
CREAT SERPL-MCNC: 0.92 MG/DL — SIGNIFICANT CHANGE UP (ref 0.5–1.3)
EOSINOPHIL # BLD AUTO: 0.06 K/UL — SIGNIFICANT CHANGE UP (ref 0–0.5)
EOSINOPHIL NFR BLD AUTO: 0.7 % — SIGNIFICANT CHANGE UP (ref 0–6)
ETHANOL BLD-MCNC: < 10 MG/DL — SIGNIFICANT CHANGE UP
GLUCOSE SERPL-MCNC: 105 MG/DL — HIGH (ref 70–99)
GLUCOSE UR-MCNC: NEGATIVE — SIGNIFICANT CHANGE UP
HCT VFR BLD CALC: 44.9 % — SIGNIFICANT CHANGE UP (ref 39–50)
HGB BLD-MCNC: 14.9 G/DL — SIGNIFICANT CHANGE UP (ref 13–17)
IMM GRANULOCYTES # BLD AUTO: 0.02 # — SIGNIFICANT CHANGE UP
IMM GRANULOCYTES NFR BLD AUTO: 0.2 % — SIGNIFICANT CHANGE UP (ref 0–1.5)
KETONES UR-MCNC: NEGATIVE — SIGNIFICANT CHANGE UP
LEUKOCYTE ESTERASE UR-ACNC: NEGATIVE — SIGNIFICANT CHANGE UP
LYMPHOCYTES # BLD AUTO: 2.8 K/UL — SIGNIFICANT CHANGE UP (ref 1–3.3)
LYMPHOCYTES # BLD AUTO: 31.1 % — SIGNIFICANT CHANGE UP (ref 13–44)
MCHC RBC-ENTMCNC: 28.6 PG — SIGNIFICANT CHANGE UP (ref 27–34)
MCHC RBC-ENTMCNC: 33.2 % — SIGNIFICANT CHANGE UP (ref 32–36)
MCV RBC AUTO: 86.2 FL — SIGNIFICANT CHANGE UP (ref 80–100)
METHADONE UR-MCNC: NEGATIVE — SIGNIFICANT CHANGE UP
MONOCYTES # BLD AUTO: 0.47 K/UL — SIGNIFICANT CHANGE UP (ref 0–0.9)
MONOCYTES NFR BLD AUTO: 5.2 % — SIGNIFICANT CHANGE UP (ref 2–14)
MUCOUS THREADS # UR AUTO: SIGNIFICANT CHANGE UP
NEUTROPHILS # BLD AUTO: 5.62 K/UL — SIGNIFICANT CHANGE UP (ref 1.8–7.4)
NEUTROPHILS NFR BLD AUTO: 62.4 % — SIGNIFICANT CHANGE UP (ref 43–77)
NITRITE UR-MCNC: NEGATIVE — SIGNIFICANT CHANGE UP
NRBC # FLD: 0 — SIGNIFICANT CHANGE UP
OPIATES UR-MCNC: NEGATIVE — SIGNIFICANT CHANGE UP
OXYCODONE UR-MCNC: NEGATIVE — SIGNIFICANT CHANGE UP
PCP UR-MCNC: NEGATIVE — SIGNIFICANT CHANGE UP
PH UR: 6 — SIGNIFICANT CHANGE UP (ref 4.6–8)
PLATELET # BLD AUTO: 231 K/UL — SIGNIFICANT CHANGE UP (ref 150–400)
PMV BLD: 10.1 FL — SIGNIFICANT CHANGE UP (ref 7–13)
POTASSIUM SERPL-MCNC: 3.8 MMOL/L — SIGNIFICANT CHANGE UP (ref 3.5–5.3)
POTASSIUM SERPL-SCNC: 3.8 MMOL/L — SIGNIFICANT CHANGE UP (ref 3.5–5.3)
PROT SERPL-MCNC: 7.7 G/DL — SIGNIFICANT CHANGE UP (ref 6–8.3)
PROT UR-MCNC: NEGATIVE MG/DL — SIGNIFICANT CHANGE UP
RBC # BLD: 5.21 M/UL — SIGNIFICANT CHANGE UP (ref 4.2–5.8)
RBC # FLD: 12.4 % — SIGNIFICANT CHANGE UP (ref 10.3–14.5)
RBC CASTS # UR COMP ASSIST: SIGNIFICANT CHANGE UP (ref 0–?)
SALICYLATES SERPL-MCNC: < 5 MG/DL — LOW (ref 15–30)
SODIUM SERPL-SCNC: 138 MMOL/L — SIGNIFICANT CHANGE UP (ref 135–145)
SP GR SPEC: 1.02 — SIGNIFICANT CHANGE UP (ref 1–1.04)
TSH SERPL-MCNC: 1.46 UIU/ML — SIGNIFICANT CHANGE UP (ref 0.27–4.2)
UROBILINOGEN FLD QL: NORMAL MG/DL — SIGNIFICANT CHANGE UP
WBC # BLD: 9.01 K/UL — SIGNIFICANT CHANGE UP (ref 3.8–10.5)
WBC # FLD AUTO: 9.01 K/UL — SIGNIFICANT CHANGE UP (ref 3.8–10.5)
WBC UR QL: SIGNIFICANT CHANGE UP (ref 0–?)

## 2018-05-04 PROCEDURE — 99285 EMERGENCY DEPT VISIT HI MDM: CPT

## 2018-05-04 RX ORDER — PROPRANOLOL HCL 160 MG
10 CAPSULE, EXTENDED RELEASE 24HR ORAL
Qty: 0 | Refills: 0 | Status: DISCONTINUED | OUTPATIENT
Start: 2018-05-05 | End: 2018-05-11

## 2018-05-04 RX ORDER — RISPERIDONE 4 MG/1
2 TABLET ORAL AT BEDTIME
Qty: 0 | Refills: 0 | Status: DISCONTINUED | OUTPATIENT
Start: 2018-05-05 | End: 2018-05-05

## 2018-05-04 RX ORDER — HALOPERIDOL DECANOATE 100 MG/ML
5 INJECTION INTRAMUSCULAR ONCE
Qty: 0 | Refills: 0 | Status: DISCONTINUED | OUTPATIENT
Start: 2018-05-05 | End: 2018-05-11

## 2018-05-04 RX ORDER — CLONAZEPAM 1 MG
0.5 TABLET ORAL EVERY 8 HOURS
Qty: 0 | Refills: 0 | Status: DISCONTINUED | OUTPATIENT
Start: 2018-05-05 | End: 2018-05-05

## 2018-05-04 RX ORDER — LAMOTRIGINE 25 MG/1
25 TABLET, ORALLY DISINTEGRATING ORAL DAILY
Qty: 0 | Refills: 0 | Status: DISCONTINUED | OUTPATIENT
Start: 2018-05-05 | End: 2018-05-07

## 2018-05-04 RX ORDER — CLONAZEPAM 1 MG
1 TABLET ORAL AT BEDTIME
Qty: 0 | Refills: 0 | Status: DISCONTINUED | OUTPATIENT
Start: 2018-05-05 | End: 2018-05-05

## 2018-05-04 RX ORDER — ESCITALOPRAM OXALATE 10 MG/1
10 TABLET, FILM COATED ORAL DAILY
Qty: 0 | Refills: 0 | Status: DISCONTINUED | OUTPATIENT
Start: 2018-05-05 | End: 2018-05-11

## 2018-05-04 RX ORDER — DIPHENHYDRAMINE HCL 50 MG
50 CAPSULE ORAL ONCE
Qty: 0 | Refills: 0 | Status: DISCONTINUED | OUTPATIENT
Start: 2018-05-05 | End: 2018-05-11

## 2018-05-04 RX ORDER — CLONAZEPAM 1 MG
1 TABLET ORAL ONCE
Qty: 0 | Refills: 0 | Status: DISCONTINUED | OUTPATIENT
Start: 2018-05-04 | End: 2018-05-04

## 2018-05-04 RX ADMIN — Medication 1 MILLIGRAM(S): at 21:52

## 2018-05-04 RX ADMIN — Medication 1 MILLIGRAM(S): at 23:35

## 2018-05-04 NOTE — ED ADULT NURSE NOTE - OBJECTIVE STATEMENT
pt received in  c/o psych eval, pt denies SI,HI&AH, endorsing "intrusive thoughts" stating "they not logical thoughts", denies ETOH and substance use. psych eval ongoing

## 2018-05-04 NOTE — ED ADULT NURSE REASSESSMENT NOTE - NS ED NURSE REASSESS COMMENT FT1
Pt remains awake, a&ox3, verbalizing increased restlessness r/t akathisia. Pt observed pacing hallway, cooperative upon approach, able to make needs known appropriately. Pt received STAT Klonopin 1mg PO as ordered by Kateryna LOPEZ. Will continue to monitor for safety and therapeutic effect.  for [facility] admission, awaiting EMS for safe transfer.

## 2018-05-04 NOTE — ED BEHAVIORAL HEALTH ASSESSMENT NOTE - SUICIDE RISK FACTORS
Other/Command hallucinations to hurt self/Highly impulsive behavior/Agitation/severe anxiety/Perceived burden on family and others/Unable to engage in safety planning

## 2018-05-04 NOTE — ED BEHAVIORAL HEALTH ASSESSMENT NOTE - DESCRIPTION
pacing, akathetic, redirectable  ICU Vital Signs Last 24 Hrs  T(C): 37 (04 May 2018 19:49), Max: 37 (04 May 2018 19:49)  T(F): 98.6 (04 May 2018 19:49), Max: 98.6 (04 May 2018 19:49)  HR: 90 (04 May 2018 19:49) (90 - 90)  BP: 118/68 (04 May 2018 19:49) (118/68 - 118/68)  BP(mean): --  ABP: --  ABP(mean): --  RR: 16 (04 May 2018 19:49) (16 - 16)  SpO2: 97% (04 May 2018 19:49) (97% - 97%) Denies lives with parents, unemployed

## 2018-05-04 NOTE — ED BEHAVIORAL HEALTH ASSESSMENT NOTE - OTHER
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

## 2018-05-04 NOTE — ED BEHAVIORAL HEALTH ASSESSMENT NOTE - CURRENT MEDICATION
Risperdal 2mg q HS, Abilify 5mg qd, Lamictal 25mg qd, Valium (varying doses) Risperdal 2mg q HS, Abilify 5mg qd, Lamictal 25mg qd, Valium (varying doses) vs Ativan 2mg q HS, Lexapro 10mg qd

## 2018-05-04 NOTE — ED BEHAVIORAL HEALTH ASSESSMENT NOTE - SUMMARY
Patient is a 22 year old single  American male, domiciled with family, non-caregiver, with history of Schizophrenia, r/o OCD, one prior hospitalization ([facility] 1S 8/30/17 to 9/13/17), no history of suicide attempts or self harm behaviors, denies hx of violence or arrests, denies substance use/abuse, with no relevant past medical history, BIB EMS from community after patient ran into the road after making suicidal statements.  Patient's family is concerned as patient has been increasingly agitated, restless, pacing & making suicidal statements & engaging in impulsive behaviors such as running into the road. Patient has had multiple medication changes in the past 3 weeks & in ED is exhibiting marked akathisia. Patient is reporting auditory hallucinations, admitting to intrusive suicidal thoughts vs hallucinations, and admits to running into the road tonight. Patient presents a risk for harm to self and will benefit from inpatient admission for safety, medication adjustment & psychiatric stabilization.

## 2018-05-04 NOTE — ED BEHAVIORAL HEALTH ASSESSMENT NOTE - HPI (INCLUDE ILLNESS QUALITY, SEVERITY, DURATION, TIMING, CONTEXT, MODIFYING FACTORS, ASSOCIATED SIGNS AND SYMPTOMS)
Patient is a 22 year old single  American male, domiciled with family, non-caregiver, with history of Schizophrenia, r/o OCD, one prior hospitalization ([facility] 1S 8/30/17 to 9/13/17), no history of suicide attempts or self harm behaviors, denies hx of violence or arrests, denies substance use/abuse, with no relevant past medical history, BIB EMS from community after patient ran into the road after making suicidal statements.    Pt's family reports that patient has recently been very overwhelmed, anxious & pacing. They state patient was started on Abilify 5mg three weeks ago & since that time, he has been unable to stop pacing, recently has been reporting interrupted sleep & has been making suicidal comments. They state patient was very agitated last night & they brought him to [place] ED where he was sedated. They state again today patient was restless, anxious & stated that something is wrong with his brain. They state they became very concerned tonight when patient ran into the road where there was traffic. They state they are concerned that patient will harm himself given his impulsive behaviors & recurrent intrusive suicidal thoughts.   They report patient had multiple medication changes/treatments this week, including Ativan being switched to Valium; Lamictal was added; patient received a TMS treatment.    In ED, patient is observed pacing, gesturing & talking to himself. He is disorganized, tangential & restless. He states he is here because he ran towards a car on the road & states "I guess they got scared". He states he was not suicidal when he ran in front of the car, "I knew it would stop", however he also admits he has been making suicidal statements to family. He states he has constant recurrent voices, which he states are his own voice, that say "bad things". He states he doesn't want to elaborate on this statement, but admits that at times he the voices tell him to hurt himself. He states he does not want kill himself, but states that he cannot always control himself. He reports he has not been sleeping well for 3 days "due to the thoughts", states he has been constantly searching for a "cure" to Schizophrenia & OCD & reports earlier tonight he felt his family was not doing enough to help him.

## 2018-05-04 NOTE — ED ADULT TRIAGE NOTE - CHIEF COMPLAINT QUOTE
pmhx ocd, schizophrenia. pt was found wandering in the streets. denies si/hi. pt appears midly agitated in triage.

## 2018-05-04 NOTE — ED BEHAVIORAL HEALTH ASSESSMENT NOTE - CASE SUMMARY
Patient is a 22 year old single  American male, domiciled with family, non-caregiver, with history of Schizophrenia, r/o OCD, one prior hospitalization ([facility] 1S 8/30/17 to 9/13/17), no history of suicide attempts or self harm behaviors, denies hx of violence or arrests, denies substance use/abuse, with no relevant past medical history, BIB EMS from community after patient ran into the road after making suicidal statements.  Patient's family is concerned as patient has been increasingly agitated, restless, pacing & making suicidal statements & engaging in impulsive behaviors such as running into the road. Patient has had multiple medication changes in the past 3 weeks & in ED is exhibiting marked akathisia. Patient is reporting auditory hallucinations, admitting to intrusive suicidal thoughts vs hallucinations, and admits to running into the road tonight. Patient presents a risk for harm to self and will benefit from inpatient admission for safety, medication adjustment & psychiatric stabilization.

## 2018-05-04 NOTE — ED BEHAVIORAL HEALTH ASSESSMENT NOTE - PSYCHIATRIC ISSUES AND PLAN (INCLUDE STANDING AND PRN MEDICATION)
Stop Abilify given akathisia. Continue Risperdal 2mg q HS and consider increasing dose. Switch Valium/Ativan to Klonopin 1mg q HS. Continue Lamictal 25mg q HS for mood stabilization (primary team to determine if this is benefiting patient). Ativan/Haldol/Benadryl PRN for agitation Stop Abilify given akathisia. Start Propranolol 10mg BID (with hold parameters). Increase Risperdal to 1mg q AM & 2mg q HS. Switch Valium/Ativan to Klonopin 1mg q HS. Continue Lamictal 25mg q AM for mood stabilization & Lexapro 10mg q AM(primary team to determine if this is benefiting patient). Ativan/Haldol/Benadryl PRN for agitation

## 2018-05-04 NOTE — ED PROVIDER NOTE - MEDICAL DECISION MAKING DETAILS
21 y/o M hx OCD, Schizophrenia     Labs, Urine Tox/UA, EKG, HCG. No evidence of physical  injuries, broken  skin or deformities.    Medical evaluation performed. There is no clinical evidence of intoxication or any acute medical problem requiring immediate intervention. Patient is awaiting psychiatric consultation. Final disposition will be determined by psychiatrist.

## 2018-05-04 NOTE — ED BEHAVIORAL HEALTH ASSESSMENT NOTE - RISK ASSESSMENT
Risk factors include Schizophrenia, male gender, psychic anxiety, akathisia, insomnia, suicidal thoughts, impulsivity which increase the risk of suicide for this patient.

## 2018-05-04 NOTE — ED PROVIDER NOTE - OBJECTIVE STATEMENT
23 y/o M hx OCD, Schizophrenia  BIB family w c/o agitation and bizarre behaviors. Admits to medication compliance. States ' My thoughts are just racing, I cant stop pacing".  Denies pain, SOB, fever, N/V, chills, chest/ abdominal discomfort. Denies SI/HI/AH/VH. Denies recent use of alcohol or illicit drugs.

## 2018-05-05 PROCEDURE — 99222 1ST HOSP IP/OBS MODERATE 55: CPT

## 2018-05-05 RX ORDER — HYDROXYZINE HCL 10 MG
50 TABLET ORAL ONCE
Qty: 0 | Refills: 0 | Status: COMPLETED | OUTPATIENT
Start: 2018-05-05 | End: 2018-05-05

## 2018-05-05 RX ORDER — RISPERIDONE 4 MG/1
2 TABLET ORAL ONCE
Qty: 0 | Refills: 0 | Status: COMPLETED | OUTPATIENT
Start: 2018-05-05 | End: 2018-05-05

## 2018-05-05 RX ORDER — ACETAMINOPHEN 500 MG
650 TABLET ORAL EVERY 6 HOURS
Qty: 0 | Refills: 0 | Status: DISCONTINUED | OUTPATIENT
Start: 2018-05-05 | End: 2018-05-11

## 2018-05-05 RX ORDER — CLONAZEPAM 1 MG
1 TABLET ORAL
Qty: 0 | Refills: 0 | Status: DISCONTINUED | OUTPATIENT
Start: 2018-05-05 | End: 2018-05-11

## 2018-05-05 RX ORDER — HYDROXYZINE HCL 10 MG
50 TABLET ORAL EVERY 6 HOURS
Qty: 0 | Refills: 0 | Status: DISCONTINUED | OUTPATIENT
Start: 2018-05-05 | End: 2018-05-11

## 2018-05-05 RX ORDER — OLANZAPINE 15 MG/1
5 TABLET, FILM COATED ORAL EVERY 6 HOURS
Qty: 0 | Refills: 0 | Status: DISCONTINUED | OUTPATIENT
Start: 2018-05-05 | End: 2018-05-11

## 2018-05-05 RX ORDER — OLANZAPINE 15 MG/1
5 TABLET, FILM COATED ORAL AT BEDTIME
Qty: 0 | Refills: 0 | Status: DISCONTINUED | OUTPATIENT
Start: 2018-05-05 | End: 2018-05-06

## 2018-05-05 RX ADMIN — OLANZAPINE 5 MILLIGRAM(S): 15 TABLET, FILM COATED ORAL at 20:53

## 2018-05-05 RX ADMIN — RISPERIDONE 2 MILLIGRAM(S): 4 TABLET ORAL at 00:50

## 2018-05-05 RX ADMIN — ESCITALOPRAM OXALATE 10 MILLIGRAM(S): 10 TABLET, FILM COATED ORAL at 08:39

## 2018-05-05 RX ADMIN — LAMOTRIGINE 25 MILLIGRAM(S): 25 TABLET, ORALLY DISINTEGRATING ORAL at 08:39

## 2018-05-05 RX ADMIN — Medication 10 MILLIGRAM(S): at 20:54

## 2018-05-05 RX ADMIN — Medication 50 MILLIGRAM(S): at 00:50

## 2018-05-05 RX ADMIN — Medication 10 MILLIGRAM(S): at 08:39

## 2018-05-05 RX ADMIN — Medication 1 MILLIGRAM(S): at 20:53

## 2018-05-05 RX ADMIN — Medication 50 MILLIGRAM(S): at 10:03

## 2018-05-05 RX ADMIN — Medication 2 MILLIGRAM(S): at 12:02

## 2018-05-05 NOTE — ED BEHAVIORAL HEALTH NOTE - BEHAVIORAL HEALTH NOTE
Writer called [facility] to obtain insurance authorization.  Yanet spoke to Corie  who approved 3 days [facility]#[id number] call is due 5/7 with Jose at [phone]

## 2018-05-06 PROCEDURE — 99232 SBSQ HOSP IP/OBS MODERATE 35: CPT

## 2018-05-06 RX ORDER — OLANZAPINE 15 MG/1
7.5 TABLET, FILM COATED ORAL AT BEDTIME
Qty: 0 | Refills: 0 | Status: DISCONTINUED | OUTPATIENT
Start: 2018-05-06 | End: 2018-05-07

## 2018-05-06 RX ADMIN — ESCITALOPRAM OXALATE 10 MILLIGRAM(S): 10 TABLET, FILM COATED ORAL at 09:34

## 2018-05-06 RX ADMIN — OLANZAPINE 7.5 MILLIGRAM(S): 15 TABLET, FILM COATED ORAL at 20:53

## 2018-05-06 RX ADMIN — Medication 2 MILLIGRAM(S): at 16:18

## 2018-05-06 RX ADMIN — Medication 1 MILLIGRAM(S): at 20:53

## 2018-05-06 RX ADMIN — Medication 1 MILLIGRAM(S): at 09:34

## 2018-05-06 RX ADMIN — LAMOTRIGINE 25 MILLIGRAM(S): 25 TABLET, ORALLY DISINTEGRATING ORAL at 09:34

## 2018-05-06 RX ADMIN — Medication 10 MILLIGRAM(S): at 09:34

## 2018-05-06 RX ADMIN — Medication 50 MILLIGRAM(S): at 21:17

## 2018-05-06 RX ADMIN — Medication 10 MILLIGRAM(S): at 20:53

## 2018-05-07 RX ORDER — HYDROXYZINE HCL 10 MG
50 TABLET ORAL ONCE
Qty: 0 | Refills: 0 | Status: COMPLETED | OUTPATIENT
Start: 2018-05-07 | End: 2018-05-07

## 2018-05-07 RX ORDER — OLANZAPINE 15 MG/1
10 TABLET, FILM COATED ORAL AT BEDTIME
Qty: 0 | Refills: 0 | Status: DISCONTINUED | OUTPATIENT
Start: 2018-05-07 | End: 2018-05-11

## 2018-05-07 RX ADMIN — Medication 50 MILLIGRAM(S): at 12:28

## 2018-05-07 RX ADMIN — Medication 1 MILLIGRAM(S): at 09:52

## 2018-05-07 RX ADMIN — Medication 50 MILLIGRAM(S): at 18:09

## 2018-05-07 RX ADMIN — Medication 10 MILLIGRAM(S): at 09:52

## 2018-05-07 RX ADMIN — OLANZAPINE 10 MILLIGRAM(S): 15 TABLET, FILM COATED ORAL at 21:18

## 2018-05-07 RX ADMIN — Medication 1 MILLIGRAM(S): at 21:18

## 2018-05-07 RX ADMIN — Medication 1 MILLIGRAM(S): at 21:49

## 2018-05-07 RX ADMIN — Medication 1 MILLIGRAM(S): at 13:53

## 2018-05-07 RX ADMIN — Medication 10 MILLIGRAM(S): at 21:18

## 2018-05-07 RX ADMIN — ESCITALOPRAM OXALATE 10 MILLIGRAM(S): 10 TABLET, FILM COATED ORAL at 09:52

## 2018-05-07 RX ADMIN — LAMOTRIGINE 25 MILLIGRAM(S): 25 TABLET, ORALLY DISINTEGRATING ORAL at 09:52

## 2018-05-07 RX ADMIN — Medication 650 MILLIGRAM(S): at 20:02

## 2018-05-08 RX ADMIN — Medication 50 MILLIGRAM(S): at 12:42

## 2018-05-08 RX ADMIN — OLANZAPINE 5 MILLIGRAM(S): 15 TABLET, FILM COATED ORAL at 21:30

## 2018-05-08 RX ADMIN — Medication 10 MILLIGRAM(S): at 21:04

## 2018-05-08 RX ADMIN — Medication 1 MILLIGRAM(S): at 21:04

## 2018-05-08 RX ADMIN — Medication 1 MILLIGRAM(S): at 10:12

## 2018-05-08 RX ADMIN — Medication 2 MILLIGRAM(S): at 21:30

## 2018-05-08 RX ADMIN — ESCITALOPRAM OXALATE 10 MILLIGRAM(S): 10 TABLET, FILM COATED ORAL at 09:09

## 2018-05-08 RX ADMIN — Medication 1 MILLIGRAM(S): at 09:09

## 2018-05-08 RX ADMIN — OLANZAPINE 10 MILLIGRAM(S): 15 TABLET, FILM COATED ORAL at 21:04

## 2018-05-08 RX ADMIN — Medication 50 MILLIGRAM(S): at 17:12

## 2018-05-08 RX ADMIN — Medication 10 MILLIGRAM(S): at 09:09

## 2018-05-09 RX ORDER — HYDROXYZINE HCL 10 MG
50 TABLET ORAL ONCE
Qty: 0 | Refills: 0 | Status: COMPLETED | OUTPATIENT
Start: 2018-05-09 | End: 2018-05-09

## 2018-05-09 RX ORDER — OLANZAPINE 15 MG/1
5 TABLET, FILM COATED ORAL DAILY
Qty: 0 | Refills: 0 | Status: DISCONTINUED | OUTPATIENT
Start: 2018-05-10 | End: 2018-05-11

## 2018-05-09 RX ORDER — OLANZAPINE 15 MG/1
5 TABLET, FILM COATED ORAL ONCE
Qty: 0 | Refills: 0 | Status: COMPLETED | OUTPATIENT
Start: 2018-05-09 | End: 2018-05-09

## 2018-05-09 RX ADMIN — Medication 10 MILLIGRAM(S): at 10:15

## 2018-05-09 RX ADMIN — Medication 1 MILLIGRAM(S): at 10:15

## 2018-05-09 RX ADMIN — Medication 10 MILLIGRAM(S): at 20:57

## 2018-05-09 RX ADMIN — OLANZAPINE 5 MILLIGRAM(S): 15 TABLET, FILM COATED ORAL at 11:51

## 2018-05-09 RX ADMIN — OLANZAPINE 10 MILLIGRAM(S): 15 TABLET, FILM COATED ORAL at 20:57

## 2018-05-09 RX ADMIN — OLANZAPINE 5 MILLIGRAM(S): 15 TABLET, FILM COATED ORAL at 14:09

## 2018-05-09 RX ADMIN — Medication 50 MILLIGRAM(S): at 17:51

## 2018-05-09 RX ADMIN — ESCITALOPRAM OXALATE 10 MILLIGRAM(S): 10 TABLET, FILM COATED ORAL at 10:15

## 2018-05-09 RX ADMIN — Medication 50 MILLIGRAM(S): at 13:09

## 2018-05-09 RX ADMIN — Medication 1 MILLIGRAM(S): at 20:57

## 2018-05-10 PROBLEM — F20.9 SCHIZOPHRENIA, UNSPECIFIED: Chronic | Status: ACTIVE | Noted: 2018-05-04

## 2018-05-10 RX ORDER — LAMOTRIGINE 25 MG/1
1 TABLET, ORALLY DISINTEGRATING ORAL
Qty: 0 | Refills: 0 | COMMUNITY

## 2018-05-10 RX ORDER — ESCITALOPRAM OXALATE 10 MG/1
1 TABLET, FILM COATED ORAL
Qty: 30 | Refills: 0 | OUTPATIENT
Start: 2018-05-10 | End: 2018-06-08

## 2018-05-10 RX ORDER — CLONAZEPAM 1 MG
1 TABLET ORAL
Qty: 28 | Refills: 0 | OUTPATIENT
Start: 2018-05-10 | End: 2018-05-23

## 2018-05-10 RX ORDER — DIAZEPAM 5 MG
0 TABLET ORAL
Qty: 0 | Refills: 0 | COMMUNITY

## 2018-05-10 RX ORDER — ESCITALOPRAM OXALATE 10 MG/1
1 TABLET, FILM COATED ORAL
Qty: 0 | Refills: 0 | COMMUNITY

## 2018-05-10 RX ORDER — PROPRANOLOL HCL 160 MG
1 CAPSULE, EXTENDED RELEASE 24HR ORAL
Qty: 60 | Refills: 0 | OUTPATIENT
Start: 2018-05-10 | End: 2018-06-08

## 2018-05-10 RX ORDER — OLANZAPINE 15 MG/1
1 TABLET, FILM COATED ORAL
Qty: 30 | Refills: 0 | OUTPATIENT
Start: 2018-05-10 | End: 2018-06-08

## 2018-05-10 RX ORDER — ARIPIPRAZOLE 15 MG/1
1 TABLET ORAL
Qty: 0 | Refills: 0 | COMMUNITY

## 2018-05-10 RX ADMIN — Medication 1 MILLIGRAM(S): at 20:55

## 2018-05-10 RX ADMIN — Medication 10 MILLIGRAM(S): at 09:30

## 2018-05-10 RX ADMIN — Medication 50 MILLIGRAM(S): at 23:10

## 2018-05-10 RX ADMIN — ESCITALOPRAM OXALATE 10 MILLIGRAM(S): 10 TABLET, FILM COATED ORAL at 09:30

## 2018-05-10 RX ADMIN — OLANZAPINE 10 MILLIGRAM(S): 15 TABLET, FILM COATED ORAL at 20:55

## 2018-05-10 RX ADMIN — OLANZAPINE 5 MILLIGRAM(S): 15 TABLET, FILM COATED ORAL at 09:30

## 2018-05-10 RX ADMIN — Medication 1 MILLIGRAM(S): at 09:30

## 2018-05-10 RX ADMIN — Medication 10 MILLIGRAM(S): at 20:55

## 2018-05-10 RX ADMIN — Medication 50 MILLIGRAM(S): at 12:57

## 2018-05-11 VITALS — DIASTOLIC BLOOD PRESSURE: 67 MMHG | SYSTOLIC BLOOD PRESSURE: 96 MMHG | HEART RATE: 61 BPM | TEMPERATURE: 98 F

## 2018-05-11 RX ADMIN — Medication 10 MILLIGRAM(S): at 10:11

## 2018-05-11 RX ADMIN — ESCITALOPRAM OXALATE 10 MILLIGRAM(S): 10 TABLET, FILM COATED ORAL at 10:11

## 2018-05-11 RX ADMIN — Medication 50 MILLIGRAM(S): at 12:21

## 2018-05-11 RX ADMIN — Medication 1 MILLIGRAM(S): at 10:11

## 2018-05-11 RX ADMIN — OLANZAPINE 5 MILLIGRAM(S): 15 TABLET, FILM COATED ORAL at 10:11

## 2018-05-15 ENCOUNTER — OUTPATIENT (OUTPATIENT)
Dept: OUTPATIENT SERVICES | Facility: HOSPITAL | Age: 23
LOS: 1 days | Discharge: HOME | End: 2018-05-15

## 2018-05-16 DIAGNOSIS — F20.9 SCHIZOPHRENIA, UNSPECIFIED: ICD-10-CM

## 2018-05-22 ENCOUNTER — OUTPATIENT (OUTPATIENT)
Dept: OUTPATIENT SERVICES | Facility: HOSPITAL | Age: 23
LOS: 1 days | Discharge: HOME | End: 2018-05-22
Payer: COMMERCIAL

## 2018-05-22 DIAGNOSIS — F33.9 MAJOR DEPRESSIVE DISORDER, RECURRENT, UNSPECIFIED: ICD-10-CM

## 2018-06-11 PROCEDURE — 90870 ELECTROCONVULSIVE THERAPY: CPT

## 2018-06-13 PROCEDURE — 90870 ELECTROCONVULSIVE THERAPY: CPT

## 2018-06-13 RX ORDER — MIDAZOLAM HYDROCHLORIDE 1 MG/ML
2 INJECTION, SOLUTION INTRAMUSCULAR; INTRAVENOUS ONCE
Qty: 0 | Refills: 0 | Status: DISCONTINUED | OUTPATIENT
Start: 2018-06-13 | End: 2018-06-13

## 2018-06-13 RX ADMIN — MIDAZOLAM HYDROCHLORIDE 2 MILLIGRAM(S): 1 INJECTION, SOLUTION INTRAMUSCULAR; INTRAVENOUS at 12:01

## 2018-06-28 PROCEDURE — 90870 ELECTROCONVULSIVE THERAPY: CPT

## 2018-06-28 RX ORDER — MIDAZOLAM HYDROCHLORIDE 1 MG/ML
2 INJECTION, SOLUTION INTRAMUSCULAR; INTRAVENOUS ONCE
Qty: 0 | Refills: 0 | Status: DISCONTINUED | OUTPATIENT
Start: 2018-06-28 | End: 2018-06-28

## 2018-06-28 RX ADMIN — MIDAZOLAM HYDROCHLORIDE 2 MILLIGRAM(S): 1 INJECTION, SOLUTION INTRAMUSCULAR; INTRAVENOUS at 13:20

## 2018-08-07 DIAGNOSIS — F20.9 SCHIZOPHRENIA, UNSPECIFIED: ICD-10-CM

## 2018-08-07 DIAGNOSIS — F33.2 MAJOR DEPRESSIVE DISORDER, RECURRENT SEVERE WITHOUT PSYCHOTIC FEATURES: ICD-10-CM

## 2018-09-06 PROCEDURE — 90870 ELECTROCONVULSIVE THERAPY: CPT

## 2018-09-06 RX ORDER — MIDAZOLAM HYDROCHLORIDE 1 MG/ML
2 INJECTION, SOLUTION INTRAMUSCULAR; INTRAVENOUS ONCE
Qty: 0 | Refills: 0 | Status: DISCONTINUED | OUTPATIENT
Start: 2018-09-06 | End: 2018-09-06

## 2018-09-06 RX ADMIN — MIDAZOLAM HYDROCHLORIDE 2 MILLIGRAM(S): 1 INJECTION, SOLUTION INTRAMUSCULAR; INTRAVENOUS at 18:03

## 2020-01-14 NOTE — ED ADULT TRIAGE NOTE - ESI TRIAGE ACUITY LEVEL, MLM
DISCHARGE INSTRUCTIONS PROVIDED FOR PATIENT. PATIENT UNDERSTOOD ALL REASONS
AND INDICATIONS FOR FOLLOW UP APPOINTMENTS. NO SIGNS OF DISTRESS NOTED.
PICTURES ATTEMPTED TO BE TAKEN BUT CAMERA WAS LOW ON BATTERY. DRESSING CHANGED
PRIOR TO DISCHARGE. IV CATHETER DISCONTINUED. PATIENT ID BANDS REMOVED.
PATIENT SAFELY TRANSPORTED TO PRIVATE CAR WITH WHEELCHAIR. ALL BELONGINGS AND
CRUTCHES GIVEN TO PATIENT. 3

## 2020-09-23 NOTE — ED ADULT TRIAGE NOTE - NS ED NURSE AMBULANCES
9/23/2020  Spoke to Shilpa for CCM. Updates to patient care team/ comments: No changes per patient.   Patient reported changes in medications: No changes per patient  Med Adherence  Comment: Taking as prescribed    Health Maintenance: Pt aware  Colon wash hands frequently for at least 20 seconds, use hand  when soap is not available. Patient verbalized understanding.    Time Spent This Encounter Total: 20 min medical record review, telephone communication, care plan updates where needed, educat [facility]

## 2021-03-15 NOTE — ED BEHAVIORAL HEALTH ASSESSMENT NOTE - CURRENTLY ENROLLED STUDENT
Detail Level: Generalized Instructions: This plan will send the code FBSD to the PM system.  DO NOT or CHANGE the price. Price (Do Not Change): 0.00 No

## 2023-07-27 NOTE — ED BEHAVIORAL HEALTH ASSESSMENT NOTE - NS ED BHA MSE SPEECH ARTICULATION
Per [facility] fax Kylah VICKERS is requires. KEY [id number] - Rx #: [id number]. At [email or web address]  OUTCOME -   \"Additional Information Required  Available without authorization.\"    Spoke to Radha at [facility]. Reports pt already picked up the Xarelto and med went through the insurance. PA is not needed.    Normal

## 2024-11-05 NOTE — ED BEHAVIORAL HEALTH ASSESSMENT NOTE - ORIENTED TO SITUATION
Patient using sibling nebulizer, but it seems to be not working any longer but needs for her asthma  Mom asking for another one.    [facility] [place] is confirmed  
Routed to     Please review and sign off on pended asthma medications.     Pharmacy on file verified  Last [facility] 9/4/24    Mom contacted  Requesting refill on albuterol and Budesonide  Patient currently has bad cough and congestion  Patient used siblings nebulizer for relief- using every 4-6 hours and finding relief    No fever  No wheezing  No shortness of breath  Eating/drinking well  No change in behavior    Supportive care measures per peds triage protocol reviewed  Advised to follow up for any new or worsening symptoms  Mom verbalized understanding     
Yes